# Patient Record
Sex: MALE | Race: WHITE | Employment: FULL TIME | URBAN - METROPOLITAN AREA
[De-identification: names, ages, dates, MRNs, and addresses within clinical notes are randomized per-mention and may not be internally consistent; named-entity substitution may affect disease eponyms.]

---

## 2017-08-28 ENCOUNTER — OFFICE VISIT (OUTPATIENT)
Dept: INTERNAL MEDICINE CLINIC | Age: 27
End: 2017-08-28

## 2017-08-28 VITALS
DIASTOLIC BLOOD PRESSURE: 72 MMHG | SYSTOLIC BLOOD PRESSURE: 118 MMHG | OXYGEN SATURATION: 97 % | BODY MASS INDEX: 23.96 KG/M2 | HEART RATE: 75 BPM | WEIGHT: 167 LBS

## 2017-08-28 DIAGNOSIS — G89.29 CHRONIC RIGHT-SIDED LOW BACK PAIN WITH RIGHT-SIDED SCIATICA: Primary | ICD-10-CM

## 2017-08-28 DIAGNOSIS — Z00.00 PREVENTATIVE HEALTH CARE: ICD-10-CM

## 2017-08-28 DIAGNOSIS — M54.41 CHRONIC RIGHT-SIDED LOW BACK PAIN WITH RIGHT-SIDED SCIATICA: Primary | ICD-10-CM

## 2017-08-28 PROCEDURE — 99203 OFFICE O/P NEW LOW 30 MIN: CPT | Performed by: INTERNAL MEDICINE

## 2017-08-28 RX ORDER — CYCLOBENZAPRINE HCL 10 MG
10 TABLET ORAL NIGHTLY
Qty: 30 TABLET | Refills: 2 | Status: SHIPPED | OUTPATIENT
Start: 2017-08-28 | End: 2017-10-20 | Stop reason: SINTOL

## 2017-08-28 RX ORDER — MELOXICAM 15 MG/1
15 TABLET ORAL DAILY
Qty: 30 TABLET | Refills: 3 | Status: SHIPPED | OUTPATIENT
Start: 2017-08-28 | End: 2018-08-31

## 2017-08-28 RX ORDER — ACETAMINOPHEN 500 MG
1000 TABLET ORAL EVERY 6 HOURS PRN
Qty: 225 TABLET | Refills: 2 | Status: SHIPPED | OUTPATIENT
Start: 2017-08-28

## 2017-08-28 ASSESSMENT — ENCOUNTER SYMPTOMS
ABDOMINAL PAIN: 0
EYES NEGATIVE: 1
BACK PAIN: 1
RESPIRATORY NEGATIVE: 1
ALLERGIC/IMMUNOLOGIC NEGATIVE: 1
GASTROINTESTINAL NEGATIVE: 1
BOWEL INCONTINENCE: 0

## 2017-09-15 ENCOUNTER — HOSPITAL ENCOUNTER (OUTPATIENT)
Dept: PHYSICAL THERAPY | Age: 27
Discharge: HOME | End: 2017-09-30
Admitting: INTERNAL MEDICINE

## 2017-09-15 ASSESSMENT — PAIN DESCRIPTION - FREQUENCY: FREQUENCY: CONTINUOUS

## 2017-09-15 ASSESSMENT — PAIN DESCRIPTION - ORIENTATION: ORIENTATION: RIGHT

## 2017-09-15 ASSESSMENT — PAIN DESCRIPTION - DESCRIPTORS: DESCRIPTORS: CONSTANT;SHARP;SHOOTING

## 2017-09-15 ASSESSMENT — PAIN DESCRIPTION - LOCATION: LOCATION: BACK

## 2017-09-15 ASSESSMENT — PAIN DESCRIPTION - PROGRESSION: CLINICAL_PROGRESSION: GRADUALLY WORSENING

## 2017-09-15 ASSESSMENT — PAIN DESCRIPTION - PAIN TYPE: TYPE: CHRONIC PAIN

## 2017-09-15 ASSESSMENT — PAIN SCALES - GENERAL: PAINLEVEL_OUTOF10: 6

## 2017-09-15 NOTE — PROGRESS NOTES
Motor?: WNL  Additional Measures  Flexibility: HS 90/90. R to 163 deg, L to 170 deg        Assessment   Conditions Requiring Skilled Therapeutic Intervention  Body structures, Functions, Activity limitations: Decreased functional mobility ; Decreased ROM; Decreased strength;Decreased endurance  Assessment: Patient is a 34 yo male who presents with chronic low back pain and R lower extremity sciaitica. This is resulting in core and hip weakness, increased pain levels, and decreased ability to fully participate in recreational activities. Patient would benefit from skilled PT services to address the above deficits and return to a PLOF. Treatment Diagnosis: RIght sided low back pain with radicular symptoms, hip extensor and abductor weakness, core weakness  Prognosis: Good  Decision Making: Low Complexity  REQUIRES PT FOLLOW UP: Yes  Activity Tolerance  Activity Tolerance: Patient Tolerated treatment well         Plan   Plan  Times per week: 2x  Plan weeks: 6 weeks     G-Code  PT G-Codes  Functional Assessment Tool Used: Oswestry Disability Index  Score: 15/50  Functional Limitation: Changing and maintaining body position  Changing and Maintaining Body Position Current Status (): At least 20 percent but less than 40 percent impaired, limited or restricted  Changing and Maintaining Body Position Goal Status (): 0 percent impaired, limited or restricted    Goals  Long term goals  Time Frame for Long term goals : 6 weeks  Long term goal 1: Patient to increase hip ext and hip abd strength to 4+/5 or greater to improve stability and reduce difficulty with recreational sports. Long term goal 2: Patient to play a full game of softball  and work and entire shift at work without pain following to return to PLOF.    Long term goal 3: Patient to report pain no greater than 1-2/10 at worst level to return to PLOF   Long term goal 4: Patient to be independent with HEP to improve flexibility, strength and prevent future

## 2017-09-15 NOTE — PROGRESS NOTES
travel anywhere without pain  [x]B. I can travel anywhere but it gives me extra pain  []C. Pain is bad but I manage journeys over 2 hours  []D. Pain restricts me to journeys of less than 1 hour  []E. Pain restricts me to short necessary journeys under 30 minutes  []F. Pain prevents me from traveling except to receive treatment     COMMENTS:     Patient Score:  15      Scoring Method for the Oswestry Low Back Disability Questionnaire      1. Each of the 10 sections is scored separately (0 to 5 points each) then added up (max. Total = 50). EXAMPLE:  Section 1. Pain Intensity  Item Score Item Description Point Value   A I have no pain at the moment 0   B The pain is very mild at the moment 1   C The pain is moderate at the moment 2   D The pain is fairly severe at the moment 3   E The pain is very severe at the moment 4   F The pain is the worst imaginable 5     2. If all 10 sections are completed, simply double the patient's score. 3. If a section is omitted, divide the patient's total score by the number of sections completed times 5. FORMULA: [(Patient's score) / (# Sections Completed X 5)] X 100 = ____% Disability    EXAMPLE:  If number of sections completed = 9  If patient's score = 22  The equation = [22 / (9 X 5)] X 100 = 48.9% Disability    4. Interpretation of disability scores:    SCORE SCORE   0-20% Minimal Disability Can cope with most ADL's. Usually no treatment needed, apart from advice on lifting, sitting, posture, physical fitness and diet. In this group, some patients have particular difficulty with sitting and this may be important if their occupation is sedentary (, , etc.)    27-99% Moderate Disability This group experiences more pain and problems with sitting, lifting, and standing. Travel and social life are more difficult and may well be off work.   Personal care, sexual activity, and sleeping ar not grossly affected, and the back condition can usually be managed by

## 2017-09-15 NOTE — FLOWSHEET NOTE
tolerated treatment well [] Patient limited by fatigue  [] Patient limited by pain  [] Patient limited by other medical complications  [] Other:     Prognosis: [x] Good [] Fair  [] Poor    Patient Requires Follow-up: [x] Yes  [] No    Plan:   [] Continue per plan of care [] Alter current plan (see comments)  [x] Plan of care initiated [] Hold pending MD visit [] Discharge  Plan for Next Session:   Hip/core strength, evaluate pitching strategy to see if modifications have been made     Electronically signed by:  Marge Mendoza PT DPT

## 2017-10-20 RX ORDER — METAXALONE 400 MG/1
800 TABLET ORAL 3 TIMES DAILY
Qty: 90 TABLET | Refills: 1 | Status: SHIPPED | OUTPATIENT
Start: 2017-10-20 | End: 2017-10-23 | Stop reason: CLARIF

## 2017-10-23 RX ORDER — CYCLOBENZAPRINE HCL 5 MG
5 TABLET ORAL 3 TIMES DAILY PRN
Qty: 90 TABLET | Refills: 1 | Status: SHIPPED | OUTPATIENT
Start: 2017-10-23 | End: 2018-08-31

## 2018-02-14 RX ORDER — HYDROCORTISONE 25 MG/ML
LOTION TOPICAL
Qty: 118 ML | Refills: 1 | Status: SHIPPED | OUTPATIENT
Start: 2018-02-14 | End: 2018-03-09 | Stop reason: SDUPTHER

## 2018-03-09 RX ORDER — HYDROCORTISONE 25 MG/ML
LOTION TOPICAL
Qty: 118 ML | Refills: 1 | Status: SHIPPED | OUTPATIENT
Start: 2018-03-09

## 2018-08-31 ENCOUNTER — OFFICE VISIT (OUTPATIENT)
Dept: INTERNAL MEDICINE CLINIC | Age: 28
End: 2018-08-31

## 2018-08-31 VITALS
HEART RATE: 60 BPM | DIASTOLIC BLOOD PRESSURE: 66 MMHG | WEIGHT: 172 LBS | BODY MASS INDEX: 24.68 KG/M2 | SYSTOLIC BLOOD PRESSURE: 118 MMHG

## 2018-08-31 DIAGNOSIS — M99.03 LUMBAR REGION SOMATIC DYSFUNCTION: ICD-10-CM

## 2018-08-31 DIAGNOSIS — G89.29 CHRONIC RIGHT-SIDED LOW BACK PAIN WITHOUT SCIATICA: Primary | ICD-10-CM

## 2018-08-31 DIAGNOSIS — M54.50 CHRONIC RIGHT-SIDED LOW BACK PAIN WITHOUT SCIATICA: Primary | ICD-10-CM

## 2018-08-31 PROCEDURE — G8420 CALC BMI NORM PARAMETERS: HCPCS | Performed by: INTERNAL MEDICINE

## 2018-08-31 PROCEDURE — G8427 DOCREV CUR MEDS BY ELIG CLIN: HCPCS | Performed by: INTERNAL MEDICINE

## 2018-08-31 PROCEDURE — 98925 OSTEOPATH MANJ 1-2 REGIONS: CPT | Performed by: INTERNAL MEDICINE

## 2018-08-31 PROCEDURE — 1036F TOBACCO NON-USER: CPT | Performed by: INTERNAL MEDICINE

## 2018-08-31 PROCEDURE — 99213 OFFICE O/P EST LOW 20 MIN: CPT | Performed by: INTERNAL MEDICINE

## 2018-08-31 RX ORDER — IBUPROFEN 200 MG
400 TABLET ORAL EVERY 6 HOURS PRN
Qty: 120 TABLET | Status: SHIPPED | COMMUNITY
Start: 2018-08-31

## 2018-08-31 NOTE — PROGRESS NOTES
[facility]) Physicians  Internal Medicine  Patient Encounter  Cindy Barrett D.O., Smitha Lakhani        Chief Complaint   Patient presents with    Back Pain     for approx 1 year       HPI: 33 y.o. male who was previously under care of Dr. Majo Hillman who is being seen today for a complaint of low back pain. Patient was evaluated for this about a year ago by Dr. Majo Hillman. He had presented with a one-month history of pain in the lumbar region. The documentation indicates that the pain was shooting type pain radiating into the right thigh. At that time activities that exacerbated the pain including prolonged sitting, prolonged lying down, twisting, or bending. He had associated stiffness, numbness and tingling. No imaging was ordered. He was placed on meloxicam, Flexeril. He tried a TENS unit over the counter which helped. Pt states he continues to have low back pain, but he is not having pain today. His significant other indicates that he actually has pain most days of the week and probably every day. At times it may even hurt to breathe. He has pain about 4/7 days of the week. The pain starts in the right low back and migrates to the left. He denies radicular pain or paresthesias. He states the pain is exacerbated with prolonged walking on concrete. 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 The pain is also worse with lifting. He has AM stiffness. He denies bowel or bladder problems. When he has he pain the severity is a 5 at it's worst which is manageable. The pain will last about 30 minutes and then the pain flares again every 2 hours. 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 He seems to have some discomfort at times with this motion. No past medical history on file. MEDICATIONS:  Prior to Visit Medications    Medication Sig Taking?  Authorizing Provider   ibuprofen (ADVIL;MOTRIN) 200 MG tablet Take 2 tablets by mouth every 6 hours as needed for Pain Yes Cale Hope,    hydrocortisone (HYTONE) 2.5 % lotion Apply topically 2 times daily. Yes Jason Gray MD   acetaminophen (TYLENOL) 500 MG tablet Take 2 tablets by mouth every 6 hours as needed for Pain Yes Jason Gray MD         Review of Systems - As per HPI      OBJECTIVE:  /66 (Site: Right Arm, Position: Sitting, Cuff Size: Large Adult)   Pulse 60   Wt 172 lb (78 kg)   BMI 24.68 kg/m²   GEN: NAD, A&O, Non-toxic  MS: Tenderness with palpation of the lumbar paraspinals primarily located in the lower lumbar spine (L5-S1). Increased soft tissue tone. Range of motion testing--  Flexion greater than 90°, Extension 10-15°, SB Left 15°, SB Right 30°. GI: Abdomen is soft, NT, BS+, No hepatomegaly. NEURO: No focal or lateralizing deficits. (-) SLR. VASC:  No carotid bruits. Pulses symmetric    OMT: Myofascial release along with HVLA manipulation to the lumbar spine performed today with excellent improvement in tightness. In fact he then indicated he was having some low back ache today which was relieved. ASSESSMENT[de-identified]  Carlos Kwok was seen today for back pain. Diagnoses and all orders for this visit:    Chronic right-sided low back pain without sciatica  -     XR LUMBAR SPINE (MIN 4 VIEWS); Future    Lumbar region somatic dysfunction  -     XR LUMBAR SPINE (MIN 4 VIEWS); Future  -     CA OSTEOPATHIC MANIP,1-2 BODY REGN        Additional Plan:  1. Patient will continue to use anti-inflammatories as needed  2. Home back stretching exercises provided. Discussed medications with patient who voiced understanding of their use, indication and potential side effects. Pt also understands the above recommendations. All questions answered.